# Patient Record
Sex: MALE | Race: BLACK OR AFRICAN AMERICAN | Employment: FULL TIME | URBAN - METROPOLITAN AREA
[De-identification: names, ages, dates, MRNs, and addresses within clinical notes are randomized per-mention and may not be internally consistent; named-entity substitution may affect disease eponyms.]

---

## 2020-05-21 ENCOUNTER — HOSPITAL ENCOUNTER (EMERGENCY)
Age: 30
Discharge: HOME | End: 2020-05-21
Attending: EMERGENCY MEDICINE
Payer: SELF-PAY

## 2020-05-21 VITALS
HEIGHT: 67 IN | RESPIRATION RATE: 16 BRPM | BODY MASS INDEX: 24.33 KG/M2 | DIASTOLIC BLOOD PRESSURE: 34 MMHG | OXYGEN SATURATION: 97 % | TEMPERATURE: 98.2 F | HEART RATE: 63 BPM | SYSTOLIC BLOOD PRESSURE: 92 MMHG | WEIGHT: 155 LBS

## 2020-05-21 DIAGNOSIS — H92.03 PAIN, EAR, BILATERAL: Primary | ICD-10-CM

## 2020-05-21 PROCEDURE — 99281 EMR DPT VST MAYX REQ PHY/QHP: CPT

## 2020-05-21 RX ORDER — AMOXICILLIN AND CLAVULANATE POTASSIUM 875; 125 MG/1; MG/1
1 TABLET, FILM COATED ORAL 2 TIMES DAILY
Qty: 20 TAB | Refills: 0 | Status: SHIPPED | OUTPATIENT
Start: 2020-05-21 | End: 2020-05-31

## 2020-05-21 RX ORDER — ACETAMINOPHEN 325 MG/1
650 TABLET ORAL
Qty: 20 TAB | Refills: 0 | Status: SHIPPED | OUTPATIENT
Start: 2020-05-21

## 2020-05-21 RX ORDER — FLUTICASONE PROPIONATE 50 MCG
2 SPRAY, SUSPENSION (ML) NASAL DAILY
Qty: 1 BOTTLE | Refills: 0 | Status: SHIPPED | OUTPATIENT
Start: 2020-05-21

## 2020-05-21 NOTE — ED NOTES
Called pt's name to take him to Room 12. The pt did not answer. Informed the charge RN. Looked outside for the pt, but he was not there.  Discussed with Jannie Leslie MD.

## 2020-05-21 NOTE — LETTER
[address] [place] [address] Dr [facility] EMERGENCY DEPT 
[address] [facility] [id number] [phone] Work/School Note Date: 5/21/2020 To Whom It May concern: 
 
Satinder Chavez was seen and treated today in the emergency room by the following provider(s): 
Attending Provider: Ruslan Jefferson MD 
Physician Assistant: GOVIND Lange. Satinder Chavez may return to work/school on 5/23/20 Sincerely, GOVIND Bowers
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
## 2020-05-21 NOTE — ED NOTES
The pt stated his BP is always low. The pt stated he is slightly dizzy, but he thinks its due to his ear pain. The pt denies chest pain and SOB.  Discussed pt's BP with GOVIND Newsome.

## 2020-05-21 NOTE — ED TRIAGE NOTES
Patient to triage with bilateral ear pain. Symptoms started last weel. Pt denies fever/chills, and a sore throat.

## 2020-05-21 NOTE — DISCHARGE INSTRUCTIONS
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
## 2020-07-06 ENCOUNTER — OFFICE VISIT (OUTPATIENT)
Dept: ORTHOPEDIC SURGERY | Age: 30
End: 2020-07-06

## 2020-07-06 VITALS
OXYGEN SATURATION: 97 % | HEIGHT: 67 IN | DIASTOLIC BLOOD PRESSURE: 49 MMHG | SYSTOLIC BLOOD PRESSURE: 112 MMHG | WEIGHT: 166.6 LBS | BODY MASS INDEX: 26.15 KG/M2 | HEART RATE: 65 BPM

## 2020-07-06 DIAGNOSIS — S39.012A STRAIN OF LUMBAR REGION, INITIAL ENCOUNTER: Primary | ICD-10-CM

## 2020-07-06 RX ORDER — RISPERIDONE 1 MG/1
TABLET, FILM COATED ORAL
COMMUNITY
Start: 2020-06-08

## 2020-07-06 RX ORDER — NAPROXEN 500 MG/1
500 TABLET ORAL 2 TIMES DAILY WITH MEALS
Qty: 30 TAB | Refills: 0 | Status: SHIPPED | OUTPATIENT
Start: 2020-07-06

## 2020-07-06 RX ORDER — METHYLPREDNISOLONE 4 MG/1
TABLET ORAL
Qty: 1 DOSE PACK | Refills: 0 | Status: SHIPPED | OUTPATIENT
Start: 2020-07-06

## 2020-07-06 NOTE — Clinical Note
7/6/20 Patient: Broderick Larkin YOB: 1990 Date of Visit: 7/6/2020 None None ([phone]) Patient Stated That They Have No Pcp 
VIA Dear None, Thank you for referring Mr. Ashok Balderrama to [address] for evaluation. My notes for this consultation are attached. If you have questions, please do not hesitate to call me. I look forward to following your patient along with you. Sincerely, David Chaidez MD